# Patient Record
Sex: MALE | Race: BLACK OR AFRICAN AMERICAN | URBAN - METROPOLITAN AREA
[De-identification: names, ages, dates, MRNs, and addresses within clinical notes are randomized per-mention and may not be internally consistent; named-entity substitution may affect disease eponyms.]

---

## 2024-04-25 ENCOUNTER — LAB (OUTPATIENT)
Dept: URBAN - METROPOLITAN AREA CLINIC 74 | Facility: CLINIC | Age: 64
End: 2024-04-25

## 2024-04-25 ENCOUNTER — OV NP (OUTPATIENT)
Dept: URBAN - METROPOLITAN AREA CLINIC 74 | Facility: CLINIC | Age: 64
End: 2024-04-25
Payer: COMMERCIAL

## 2024-04-25 VITALS
HEIGHT: 70 IN | HEART RATE: 62 BPM | SYSTOLIC BLOOD PRESSURE: 122 MMHG | BODY MASS INDEX: 30.35 KG/M2 | TEMPERATURE: 97.9 F | OXYGEN SATURATION: 96 % | DIASTOLIC BLOOD PRESSURE: 80 MMHG | WEIGHT: 212 LBS

## 2024-04-25 DIAGNOSIS — K21.9 GASTROESOPHAGEAL REFLUX DISEASE, UNSPECIFIED WHETHER ESOPHAGITIS PRESENT: ICD-10-CM

## 2024-04-25 DIAGNOSIS — R12 HEARTBURN: ICD-10-CM

## 2024-04-25 DIAGNOSIS — Z86.010 PERSONAL HISTORY OF COLONIC POLYPS: ICD-10-CM

## 2024-04-25 DIAGNOSIS — K76.0 FATTY INFILTRATION OF LIVER: ICD-10-CM

## 2024-04-25 PROBLEM — 443371000124107: Status: ACTIVE | Noted: 2024-04-25

## 2024-04-25 PROBLEM — 312824007: Status: ACTIVE | Noted: 2024-04-25

## 2024-04-25 PROBLEM — 197321007: Status: ACTIVE | Noted: 2024-04-25

## 2024-04-25 PROBLEM — 305058001: Status: ACTIVE | Noted: 2024-04-25

## 2024-04-25 PROBLEM — 428283002: Status: ACTIVE | Noted: 2024-04-25

## 2024-04-25 PROBLEM — 111566002: Status: ACTIVE | Noted: 2024-04-25

## 2024-04-25 PROBLEM — 16331000: Status: ACTIVE | Noted: 2024-04-25

## 2024-04-25 PROBLEM — 235595009: Status: ACTIVE | Noted: 2024-04-25

## 2024-04-25 PROCEDURE — 99204 OFFICE O/P NEW MOD 45 MIN: CPT | Performed by: INTERNAL MEDICINE

## 2024-04-25 PROCEDURE — 99244 OFF/OP CNSLTJ NEW/EST MOD 40: CPT | Performed by: INTERNAL MEDICINE

## 2024-04-25 RX ORDER — OLMESARTAN MEDOXOMIL AND HYDROCHLOROTHIAZIDE 20; 12.5 MG/1; MG/1
TAKE 0.5 TABLET BY MOUTH EVERY MORNING TABLET ORAL
Qty: 15 EACH | Refills: 0 | Status: ACTIVE | COMMUNITY

## 2024-04-25 RX ORDER — SODIUM, POTASSIUM,MAG SULFATES 17.5-3.13G
AS DIRECTED SOLUTION, RECONSTITUTED, ORAL ORAL
OUTPATIENT
Start: 2024-04-25

## 2024-04-25 RX ORDER — LEVOTHYROXINE SODIUM 0.11 MG/1
TAKE ONE TABLET BY MOUTH EVERY MORNING MONDAY-FRIDAY. TAKE THE 125 MCG ON SATURDAY AND SUNDAY TABLET ORAL
Qty: 30 EACH | Refills: 0 | Status: ACTIVE | COMMUNITY

## 2024-04-25 NOTE — HPI-TODAY'S VISIT:
The patient is a 63-year-old Afro-American male patient of Dr. Dennys Sanchez who referred the patient for consultation.  A copy of these document is being forwarded to the referring physician. The patient presents to the office stating that he did have a colonoscopy over 5 years ago and at that time he had a colonic polyp removed.  The patient also states that his father had colonic cancer. Currently the patient denies having any diarrhea, constipation, rectal bleeding, hematochezia, abdominal pain, abdominal distention or bloating.  There has been no unexplained weight loss. The patient also complaint of frequent episodes of gastroesophageal reflux and heartburn, it seems that this occurs on cycles and during the cycles he will take over-the-counter antiacids for several months with frequency.  Currently he denies having any dysphagia, odynophagia, nausea, vomiting.  The patient is concerned about developing a gastric or esophageal malignancy. The patient is overweight, the patient has been recommended to get a hepatic function panel with fib 4 index and a right upper quadrant ultrasound looking for fatty liver.  We discussed fatty liver with potential consequences such as cirrhosis and the predisposition for liver cancer. The patient has been advised to follow antireflux measures and diet, he will be scheduled to have an EGD and a colonoscopy. Benefits potential complications and alternatives to colonoscopy were disclosed. The patient will return for follow-up visit after completion of testing.

## 2024-04-25 NOTE — PHYSICAL EXAM CONSTITUTIONAL:
Obese, well developed, well nourished, in no acute distress, ambulating without difficulty, normal communication ability

## 2024-06-18 ENCOUNTER — CLAIMS CREATED FROM THE CLAIM WINDOW (OUTPATIENT)
Dept: URBAN - METROPOLITAN AREA SURGERY CENTER 30 | Facility: SURGERY CENTER | Age: 64
End: 2024-06-18
Payer: COMMERCIAL

## 2024-06-18 ENCOUNTER — CLAIMS CREATED FROM THE CLAIM WINDOW (OUTPATIENT)
Dept: URBAN - METROPOLITAN AREA CLINIC 4 | Facility: CLINIC | Age: 64
End: 2024-06-18
Payer: COMMERCIAL

## 2024-06-18 DIAGNOSIS — K21.9 ACID REFLUX: ICD-10-CM

## 2024-06-18 DIAGNOSIS — K29.70 GASTRITIS, UNSPECIFIED, WITHOUT BLEEDING: ICD-10-CM

## 2024-06-18 DIAGNOSIS — Z86.010 ADENOMAS PERSONAL HISTORY OF COLONIC POLYPS: ICD-10-CM

## 2024-06-18 DIAGNOSIS — K29.50 CHRONIC GASTRITIS WITHOUT BLEEDING, UNSPECIFIED GASTRITIS TYPE: ICD-10-CM

## 2024-06-18 DIAGNOSIS — K22.2 ACQUIRED ESOPHAGEAL RING: ICD-10-CM

## 2024-06-18 DIAGNOSIS — D12.2 ADENOMA OF ASCENDING COLON: ICD-10-CM

## 2024-06-18 DIAGNOSIS — R12 BURNING REFLUX: ICD-10-CM

## 2024-06-18 DIAGNOSIS — Z80.0 BROTHER AT YOUNG AGE FAMILY HISTORY OF COLON CANCER: ICD-10-CM

## 2024-06-18 DIAGNOSIS — Z80.0 FAMILY HISTORY OF COLON CANCER: ICD-10-CM

## 2024-06-18 DIAGNOSIS — K21.9 GASTRO-ESOPHAGEAL REFLUX DISEASE WITHOUT ESOPHAGITIS: ICD-10-CM

## 2024-06-18 DIAGNOSIS — K22.2 ESOPHAGEAL OBSTRUCTION: ICD-10-CM

## 2024-06-18 DIAGNOSIS — D12.2 BENIGN NEOPLASM OF ASCENDING COLON: ICD-10-CM

## 2024-06-18 DIAGNOSIS — K29.50 ANTRAL GASTRITIS: ICD-10-CM

## 2024-06-18 PROCEDURE — 88305 TISSUE EXAM BY PATHOLOGIST: CPT | Performed by: PATHOLOGY

## 2024-06-18 PROCEDURE — 45380 COLONOSCOPY AND BIOPSY: CPT | Performed by: INTERNAL MEDICINE

## 2024-06-18 PROCEDURE — 43239 EGD BIOPSY SINGLE/MULTIPLE: CPT | Performed by: INTERNAL MEDICINE

## 2024-06-18 PROCEDURE — 88312 SPECIAL STAINS GROUP 1: CPT | Performed by: PATHOLOGY

## 2024-06-18 PROCEDURE — 00813 ANES UPR LWR GI NDSC PX: CPT | Performed by: NURSE ANESTHETIST, CERTIFIED REGISTERED

## 2024-07-15 PROBLEM — 196735001: Status: ACTIVE | Noted: 2024-07-15

## 2024-07-15 PROBLEM — 197321007: Status: ACTIVE | Noted: 2024-07-15

## 2024-07-15 PROBLEM — 397881000: Status: ACTIVE | Noted: 2024-07-15

## 2024-07-15 PROBLEM — 266433003: Status: ACTIVE | Noted: 2024-07-15

## 2024-07-16 ENCOUNTER — DASHBOARD ENCOUNTERS (OUTPATIENT)
Age: 64
End: 2024-07-16

## 2024-07-16 ENCOUNTER — OFFICE VISIT (OUTPATIENT)
Dept: URBAN - METROPOLITAN AREA CLINIC 74 | Facility: CLINIC | Age: 64
End: 2024-07-16
Payer: COMMERCIAL

## 2024-07-16 ENCOUNTER — LAB OUTSIDE AN ENCOUNTER (OUTPATIENT)
Dept: URBAN - METROPOLITAN AREA CLINIC 74 | Facility: CLINIC | Age: 64
End: 2024-07-16

## 2024-07-16 VITALS
HEIGHT: 70 IN | SYSTOLIC BLOOD PRESSURE: 118 MMHG | DIASTOLIC BLOOD PRESSURE: 88 MMHG | HEART RATE: 89 BPM | WEIGHT: 211 LBS | BODY MASS INDEX: 30.21 KG/M2 | TEMPERATURE: 99.3 F

## 2024-07-16 DIAGNOSIS — K76.0 HEPATIC STEATOSIS: ICD-10-CM

## 2024-07-16 DIAGNOSIS — K76.89 LIVER CYST: ICD-10-CM

## 2024-07-16 DIAGNOSIS — K21.00 CHRONIC REFLUX ESOPHAGITIS: ICD-10-CM

## 2024-07-16 DIAGNOSIS — K82.4 GALLBLADDER POLYP: ICD-10-CM

## 2024-07-16 PROBLEM — 85057007: Status: ACTIVE | Noted: 2024-07-16

## 2024-07-16 PROCEDURE — 99213 OFFICE O/P EST LOW 20 MIN: CPT | Performed by: PHYSICIAN ASSISTANT

## 2024-07-16 RX ORDER — SODIUM, POTASSIUM,MAG SULFATES 17.5-3.13G
AS DIRECTED SOLUTION, RECONSTITUTED, ORAL ORAL
Status: ON HOLD | COMMUNITY
Start: 2024-04-25

## 2024-07-16 RX ORDER — OMEPRAZOLE 40 MG/1
1 CAPSULE 30 MINUTES BEFORE MORNING MEAL CAPSULE, DELAYED RELEASE ORAL ONCE A DAY
Qty: 90 | Refills: 1 | OUTPATIENT
Start: 2024-07-16

## 2024-07-16 RX ORDER — LEVOTHYROXINE SODIUM 0.11 MG/1
TAKE ONE TABLET BY MOUTH EVERY MORNING MONDAY-FRIDAY. TAKE THE 125 MCG ON SATURDAY AND SUNDAY TABLET ORAL
Qty: 30 EACH | Refills: 0 | Status: ACTIVE | COMMUNITY

## 2024-07-16 RX ORDER — OLMESARTAN MEDOXOMIL AND HYDROCHLOROTHIAZIDE 20; 12.5 MG/1; MG/1
TAKE 0.5 TABLET BY MOUTH EVERY MORNING TABLET ORAL
Qty: 15 EACH | Refills: 0 | Status: ACTIVE | COMMUNITY

## 2024-12-16 ENCOUNTER — LAB OUTSIDE AN ENCOUNTER (OUTPATIENT)
Dept: URBAN - METROPOLITAN AREA CLINIC 74 | Facility: CLINIC | Age: 64
End: 2024-12-16

## 2024-12-16 ENCOUNTER — TELEPHONE ENCOUNTER (OUTPATIENT)
Dept: URBAN - METROPOLITAN AREA CLINIC 74 | Facility: CLINIC | Age: 64
End: 2024-12-16

## 2024-12-19 ENCOUNTER — TELEPHONE ENCOUNTER (OUTPATIENT)
Dept: URBAN - METROPOLITAN AREA CLINIC 74 | Facility: CLINIC | Age: 64
End: 2024-12-19

## 2024-12-20 ENCOUNTER — OFFICE VISIT (OUTPATIENT)
Dept: URBAN - METROPOLITAN AREA CLINIC 74 | Facility: CLINIC | Age: 64
End: 2024-12-20
Payer: COMMERCIAL

## 2024-12-20 VITALS
SYSTOLIC BLOOD PRESSURE: 108 MMHG | DIASTOLIC BLOOD PRESSURE: 72 MMHG | WEIGHT: 214 LBS | BODY MASS INDEX: 31.7 KG/M2 | HEIGHT: 69 IN | TEMPERATURE: 99 F | HEART RATE: 61 BPM

## 2024-12-20 DIAGNOSIS — K76.89 LIVER CYST: ICD-10-CM

## 2024-12-20 DIAGNOSIS — Z86.0101 HISTORY OF ADENOMATOUS POLYP OF COLON: ICD-10-CM

## 2024-12-20 DIAGNOSIS — K76.0 HEPATIC STEATOSIS: ICD-10-CM

## 2024-12-20 PROCEDURE — 99212 OFFICE O/P EST SF 10 MIN: CPT | Performed by: PHYSICIAN ASSISTANT

## 2024-12-20 RX ORDER — OLMESARTAN MEDOXOMIL AND HYDROCHLOROTHIAZIDE 20; 12.5 MG/1; MG/1
TAKE 0.5 TABLET BY MOUTH EVERY MORNING TABLET ORAL
Qty: 15 EACH | Refills: 0 | Status: ACTIVE | COMMUNITY

## 2024-12-20 RX ORDER — LEVOTHYROXINE SODIUM 0.11 MG/1
TAKE ONE TABLET BY MOUTH EVERY MORNING MONDAY-FRIDAY. TAKE THE 125 MCG ON SATURDAY AND SUNDAY TABLET ORAL
Qty: 30 EACH | Refills: 0 | Status: ACTIVE | COMMUNITY

## 2024-12-20 RX ORDER — SODIUM, POTASSIUM,MAG SULFATES 17.5-3.13G
AS DIRECTED SOLUTION, RECONSTITUTED, ORAL ORAL
Status: ON HOLD | COMMUNITY
Start: 2024-04-25

## 2024-12-20 RX ORDER — OMEPRAZOLE 40 MG/1
1 CAPSULE 30 MINUTES BEFORE MORNING MEAL CAPSULE, DELAYED RELEASE ORAL ONCE A DAY
Qty: 90 | Refills: 1 | Status: ON HOLD | COMMUNITY
Start: 2024-07-16

## 2024-12-20 NOTE — HPI-TODAY'S VISIT:
The patient is 64-year-old male with past medical history as noted below known to Dr. Negrete is presenting to our clinic today to discuss her recent MRI results. He was last seen in 07/2024 and MRI was ordered but he did not completed till this Wednesday.  He  is no longer on Omeprazole 40 mg once daily. No GERD symptoms. He is following his diet and his GERD ysmptoms have improved significantly and he does not need to take medication.  NO GI issues today.    Diagnostic studies: -- MRI of abdomen and pelvis on 12/18/2024 noted hepatomegaly.  No solid masses detected.  Scattered hepatic cysts measuring up to approximately 3.3 cm.  No significant hepatic steatosis without fracture and calculation.  Gallbladder and biliary duct unremarkable.  Adrenal glands, kidneys, spleen, and the remaining of the exam are unremarkable.  -- RUQ US on 05/13/2024 with liver is of normal size but demonstrates increased echogenicity when compared to the right renal parenchyma compatible with hepatic steatosis. There is a 3.2 x 2.9 x 3.2 cm simple cyst, and in addition there is a 0.7 x 0.5 x 0.6 cm smaller simple cyst in the left lobe.  There is no intrahepatic or extrahepatic biliary dilatation with the common bile duct measuring 3.6 mm. The visualized pancreas appears unremarkable.  The right kidney measures 10.7 x 4.8 x 5.4 cm. The right kidney appears unremarkable.  The gallbladder demonstrates no gallstones. There may be a few gallbladder polyps measuring less than 3 mm in size. Recommend follow-up in 3-6 months to document stability. There is no pericholecystic fluid.  There is no ultrasonographic Sfoia's sign.  The gallbladder wall is normal in thickness and measures 2.2 mm. The visualized inferior vena cava appears unremarkable.  -- Labs on 04/11/2024 CBC, CMP, TSH, Free T4, HgA1c, and Lipid panel unremarakble.   Procedures: -- EGD with biopsy on 06/18/2024 by Dr. Negrete noted normal examined duodenum.  A small hiatal hernia.  Erythematous mucosa in the stomach.  Non-obstructing Schatzki ring.  Erythema at the gastroesophageal junction.  Biopsy stomach with chronic gastritis.  No H.pylori organism.  No intestinal metaplasia.  Esophagus with reflux type changes.  No Berry's esophagus or eosinophilic esophagitis.  -- Colonoscopy polypectomy on 06/18/2024 by Dr. Negrete noted one 3 mm polyp in the ascending colon, removed with a cold biopsy forceps.  Resected and retrieved.  Diverticulosis in sigmoid colon, in the descending colon, in the transverse colon, and in the ascending colon.  Non-bleeding internal hemorrhoids.  Examination was otherwise normal.  Repeat colonoscopy in 5 years for surveillance.  Biopsy with tubular adenoma colon polyp.